# Patient Record
Sex: FEMALE | URBAN - METROPOLITAN AREA
[De-identification: names, ages, dates, MRNs, and addresses within clinical notes are randomized per-mention and may not be internally consistent; named-entity substitution may affect disease eponyms.]

---

## 2020-10-26 ENCOUNTER — OFFICE VISIT (OUTPATIENT)
Dept: URBAN - METROPOLITAN AREA CLINIC 52 | Facility: CLINIC | Age: 25
End: 2020-10-26
Payer: COMMERCIAL

## 2020-10-26 DIAGNOSIS — G93.2 BENIGN INTRACRANIAL HYPERTENSION: Primary | ICD-10-CM

## 2020-10-26 PROCEDURE — 92004 COMPRE OPH EXAM NEW PT 1/>: CPT | Performed by: OPHTHALMOLOGY

## 2020-10-26 ASSESSMENT — INTRAOCULAR PRESSURE
OD: 22
OS: 14
OS: 16
OD: 16

## 2020-10-26 NOTE — IMPRESSION/PLAN
Impression: Benign intracranial hypertension: G93.2. Plan: OCT ordered and done today WNL 95 OU. No swelling seen on today's exam will monitor for changes, and recommend pt to see Neuro-ophthalmology---Dr. Antionette Heath PTS GOAL IS TO GET OFF THE TOPOMAX--REC SHE STAY ON RX UNTIL SHE SEES N.EYE
ADDDENDUM 10/27/20. DR. Khushbu Tillman IS NO LONGER WITH THE ORGANIZATION--i HAVE CALLED PT., AND ADVISED HER TO OBTAIN A NEUROLOGIST THROUGH HER PRIMARY, SO ANY CHANGES TO HER RX FOR BIH CAN BE MADE WITH HIS/HER ADVISE.

## 2020-10-28 ENCOUNTER — OFFICE VISIT (OUTPATIENT)
Dept: URBAN - METROPOLITAN AREA CLINIC 45 | Facility: CLINIC | Age: 25
End: 2020-10-28
Payer: COMMERCIAL

## 2020-10-28 DIAGNOSIS — H04.123 DRY EYE SYNDROME OF BILATERAL LACRIMAL GLANDS: Primary | ICD-10-CM

## 2020-10-28 PROCEDURE — 92002 INTRM OPH EXAM NEW PATIENT: CPT | Performed by: OPTOMETRIST

## 2021-02-16 ENCOUNTER — OFFICE VISIT (OUTPATIENT)
Dept: URBAN - METROPOLITAN AREA CLINIC 32 | Facility: CLINIC | Age: 26
End: 2021-02-16
Payer: COMMERCIAL

## 2021-02-16 DIAGNOSIS — H52.13 MYOPIA, BILATERAL: Primary | ICD-10-CM

## 2021-02-16 PROCEDURE — 92014 COMPRE OPH EXAM EST PT 1/>: CPT | Performed by: OPTOMETRIST

## 2021-02-16 PROCEDURE — 92310 CONTACT LENS FITTING OU: CPT | Performed by: OPTOMETRIST

## 2021-02-16 ASSESSMENT — VISUAL ACUITY
OD: 20/25
OS: 20/25

## 2021-02-16 ASSESSMENT — INTRAOCULAR PRESSURE
OS: 17
OD: 18

## 2021-02-16 ASSESSMENT — KERATOMETRY
OS: 43.00
OD: 42.63

## 2021-05-18 ENCOUNTER — OFFICE VISIT (OUTPATIENT)
Dept: URBAN - METROPOLITAN AREA CLINIC 32 | Facility: CLINIC | Age: 26
End: 2021-05-18
Payer: COMMERCIAL

## 2021-05-18 PROCEDURE — 99214 OFFICE O/P EST MOD 30 MIN: CPT | Performed by: OPTOMETRIST

## 2021-05-18 ASSESSMENT — INTRAOCULAR PRESSURE
OS: 15
OD: 18

## 2021-05-18 NOTE — IMPRESSION/PLAN
Impression: Benign intracranial hypertension: G93.2. Plan: OCT ordered and done today WNL 95 OU.  No swelling seen on today's exam will monitor for changes, and recommend pt to see Neuro-ophthalmology---OCT normal, HVF poor test today repeat 6 months